# Patient Record
Sex: FEMALE | Race: WHITE | Employment: FULL TIME | URBAN - METROPOLITAN AREA
[De-identification: names, ages, dates, MRNs, and addresses within clinical notes are randomized per-mention and may not be internally consistent; named-entity substitution may affect disease eponyms.]

---

## 2021-10-08 ENCOUNTER — PATIENT MESSAGE (OUTPATIENT)
Dept: RHEUMATOLOGY | Facility: CLINIC | Age: 41
End: 2021-10-08

## 2021-10-08 NOTE — TELEPHONE ENCOUNTER
From: Oriana Reeves  To: Ishan Guerrero MD  Sent: 10/8/2021 1:52 PM CDT  Subject: X-ray test results    Fine here the test results from my Left and right hand xrays on Oct 7, 2021 for use in my appointment on Oct 27, 2021.   Leslye Boss

## 2021-10-26 ENCOUNTER — PATIENT MESSAGE (OUTPATIENT)
Dept: RHEUMATOLOGY | Facility: CLINIC | Age: 41
End: 2021-10-26

## 2021-10-27 ENCOUNTER — OFFICE VISIT (OUTPATIENT)
Dept: RHEUMATOLOGY | Facility: CLINIC | Age: 41
End: 2021-10-27
Payer: COMMERCIAL

## 2021-10-27 ENCOUNTER — LAB ENCOUNTER (OUTPATIENT)
Dept: LAB | Age: 41
End: 2021-10-27
Attending: INTERNAL MEDICINE
Payer: COMMERCIAL

## 2021-10-27 VITALS
BODY MASS INDEX: 20.32 KG/M2 | HEART RATE: 85 BPM | WEIGHT: 119 LBS | HEIGHT: 64 IN | DIASTOLIC BLOOD PRESSURE: 75 MMHG | SYSTOLIC BLOOD PRESSURE: 121 MMHG

## 2021-10-27 DIAGNOSIS — M79.642 PAIN IN BOTH HANDS: ICD-10-CM

## 2021-10-27 DIAGNOSIS — M79.641 PAIN IN BOTH HANDS: ICD-10-CM

## 2021-10-27 DIAGNOSIS — M79.642 PAIN IN BOTH HANDS: Primary | ICD-10-CM

## 2021-10-27 DIAGNOSIS — M79.641 PAIN IN BOTH HANDS: Primary | ICD-10-CM

## 2021-10-27 PROCEDURE — 3078F DIAST BP <80 MM HG: CPT | Performed by: INTERNAL MEDICINE

## 2021-10-27 PROCEDURE — 36415 COLL VENOUS BLD VENIPUNCTURE: CPT

## 2021-10-27 PROCEDURE — 3074F SYST BP LT 130 MM HG: CPT | Performed by: INTERNAL MEDICINE

## 2021-10-27 PROCEDURE — 3008F BODY MASS INDEX DOCD: CPT | Performed by: INTERNAL MEDICINE

## 2021-10-27 PROCEDURE — 99204 OFFICE O/P NEW MOD 45 MIN: CPT | Performed by: INTERNAL MEDICINE

## 2021-10-27 PROCEDURE — 86200 CCP ANTIBODY: CPT

## 2021-10-27 RX ORDER — METHYLPREDNISOLONE 4 MG/1
TABLET ORAL
Qty: 1 EACH | Refills: 0 | Status: SHIPPED | OUTPATIENT
Start: 2021-10-27

## 2021-10-27 RX ORDER — LEVONORGESTREL AND ETHINYL ESTRADIOL AND ETHINYL ESTRADIOL 150-30(84)
1 KIT ORAL DAILY
COMMUNITY
Start: 2021-10-08

## 2021-10-27 RX ORDER — LEVOTHYROXINE SODIUM 0.03 MG/1
1 TABLET ORAL AS DIRECTED
COMMUNITY
Start: 2021-07-21

## 2021-10-27 NOTE — PROGRESS NOTES
Dear Dr. Kusum Antonio:    I saw your patient Regla Nicole in consultation this afternoon at your request, for evaluation of bilateral hand and wrist pain. As you know, she is a 78-year-old woman who her entire life has had joint pains on and off in her hands. history:  Her maternal grandfather has rheumatoid arthritis. Maternal grandmother osteoarthritis. Her mother has pain in her hands. Social history:  She is . 3 children. She is a manager an . No cigarettes.   Infrequent alco Dosepak. CCP antibody will be done. Her other labs were all negative. I will give her a call next week to see how she did with the Medrol Dosepak, and review her lab test result.   If she continues to have a lot of pain, I will order an MRI with and wi

## 2021-10-27 NOTE — TELEPHONE ENCOUNTER
From: Carlos Jimenez  To: Javier Stoll MD  Sent: 10/26/2021 6:04 PM CDT  Subject: Blood work results for 10/27/21 appt    Find attached the blood work results from my GP done on Oct 7, 2021.  This is for use in my appointment on Oct 27

## 2021-11-01 ENCOUNTER — TELEPHONE (OUTPATIENT)
Dept: RHEUMATOLOGY | Facility: CLINIC | Age: 41
End: 2021-11-01

## 2021-11-01 NOTE — TELEPHONE ENCOUNTER
I called Rg Robertson with her lab result from October 27th of 2021. Her CCP antibody is negative. She is taking a Medrol Dosepak, and her hands are much improved. She will finish the pack.   If it comes back she will notify my office, and a right hand

## 2021-11-09 ENCOUNTER — TELEPHONE (OUTPATIENT)
Dept: RHEUMATOLOGY | Facility: CLINIC | Age: 41
End: 2021-11-09

## 2021-11-09 NOTE — TELEPHONE ENCOUNTER
See patient Yves message from today. Please call her. She needs plain x-rays of her right hand first, and then she will need an MRI of her right hand with and without contrast. I ordered the studies.     I will give her a call with her MRI results onc

## 2021-11-09 NOTE — TELEPHONE ENCOUNTER
Spoke to patient, she said she did complete X-ray's     Found results in TE 10/27/21 attached.      MRI PA required    Preferred location for MRI  [facility]  P: [phone]  F: [phone]  Address: [address] [place] [address], [facility] [address]    Case # [id number]

## 2021-11-15 NOTE — TELEPHONE ENCOUNTER
Garrett Corral and they said PA is done through [id number]. Transferred to [address] [place] [address]  MRI Right Hand approved  Auth ID: [id number]  Dates: 11/9/2021 - 02/07/2022    Faxed MRI order along with authorization information to the site listed below.      Robert Cheung